# Patient Record
Sex: MALE | Race: WHITE | NOT HISPANIC OR LATINO | Employment: FULL TIME | URBAN - METROPOLITAN AREA
[De-identification: names, ages, dates, MRNs, and addresses within clinical notes are randomized per-mention and may not be internally consistent; named-entity substitution may affect disease eponyms.]

---

## 2022-04-06 ENCOUNTER — OFFICE VISIT (OUTPATIENT)
Dept: URGENT CARE | Facility: CLINIC | Age: 37
End: 2022-04-06

## 2022-04-06 VITALS
HEART RATE: 93 BPM | WEIGHT: 210 LBS | RESPIRATION RATE: 16 BRPM | HEIGHT: 74 IN | TEMPERATURE: 98 F | SYSTOLIC BLOOD PRESSURE: 151 MMHG | BODY MASS INDEX: 26.95 KG/M2 | OXYGEN SATURATION: 95 % | DIASTOLIC BLOOD PRESSURE: 108 MMHG

## 2022-04-06 DIAGNOSIS — Z11.3 SCREENING FOR STD (SEXUALLY TRANSMITTED DISEASE): ICD-10-CM

## 2022-04-06 DIAGNOSIS — N34.2 URETHRITIS: Primary | ICD-10-CM

## 2022-04-06 DIAGNOSIS — R36.9 PENILE DISCHARGE: ICD-10-CM

## 2022-04-06 LAB
BILIRUB UR QL STRIP: NEGATIVE
GLUCOSE UR QL STRIP: NEGATIVE
KETONES UR QL STRIP: NEGATIVE
LEUKOCYTE ESTERASE UR QL STRIP: POSITIVE
PH, POC UA: 5 (ref 5–8)
POC BLOOD, URINE: POSITIVE
POC NITRATES, URINE: NEGATIVE
PROT UR QL STRIP: POSITIVE
SP GR UR STRIP: 1.02 (ref 1–1.03)
UROBILINOGEN UR STRIP-ACNC: NORMAL (ref 0.3–2.2)

## 2022-04-06 PROCEDURE — 96372 THER/PROPH/DIAG INJ SC/IM: CPT | Mod: S$GLB,,,

## 2022-04-06 PROCEDURE — 99203 PR OFFICE/OUTPT VISIT, NEW, LEVL III, 30-44 MIN: ICD-10-PCS | Mod: 25,S$GLB,,

## 2022-04-06 PROCEDURE — 96372 PR INJECTION,THERAP/PROPH/DIAG2ST, IM OR SUBCUT: ICD-10-PCS | Mod: S$GLB,,,

## 2022-04-06 PROCEDURE — 87491 CHLMYD TRACH DNA AMP PROBE: CPT

## 2022-04-06 PROCEDURE — 81003 URINALYSIS AUTO W/O SCOPE: CPT | Mod: QW,S$GLB,,

## 2022-04-06 PROCEDURE — 87591 N.GONORRHOEAE DNA AMP PROB: CPT

## 2022-04-06 PROCEDURE — 81003 POCT URINALYSIS, DIPSTICK, AUTOMATED, W/O SCOPE: ICD-10-PCS | Mod: QW,S$GLB,,

## 2022-04-06 PROCEDURE — 99203 OFFICE O/P NEW LOW 30 MIN: CPT | Mod: 25,S$GLB,,

## 2022-04-06 RX ORDER — DOXYCYCLINE HYCLATE 100 MG
100 TABLET ORAL EVERY 12 HOURS
Qty: 14 TABLET | Refills: 0 | Status: SHIPPED | OUTPATIENT
Start: 2022-04-06 | End: 2022-04-13

## 2022-04-06 RX ORDER — CEFTRIAXONE 500 MG/1
500 INJECTION, POWDER, FOR SOLUTION INTRAMUSCULAR; INTRAVENOUS
Status: COMPLETED | OUTPATIENT
Start: 2022-04-06 | End: 2022-04-06

## 2022-04-06 RX ORDER — PHENAZOPYRIDINE HYDROCHLORIDE 200 MG/1
200 TABLET, FILM COATED ORAL
Qty: 15 TABLET | Refills: 0 | Status: SHIPPED | OUTPATIENT
Start: 2022-04-06 | End: 2022-04-11

## 2022-04-06 RX ORDER — LIDOCAINE HYDROCHLORIDE 10 MG/ML
1.8 INJECTION INFILTRATION; PERINEURAL
Status: COMPLETED | OUTPATIENT
Start: 2022-04-06 | End: 2022-04-06

## 2022-04-06 RX ORDER — PHENAZOPYRIDINE HYDROCHLORIDE 200 MG/1
200 TABLET, FILM COATED ORAL
Qty: 30 TABLET | Refills: 0 | Status: SHIPPED | OUTPATIENT
Start: 2022-04-06 | End: 2022-04-06

## 2022-04-06 RX ADMIN — LIDOCAINE HYDROCHLORIDE 1.8 ML: 10 INJECTION INFILTRATION; PERINEURAL at 01:04

## 2022-04-06 RX ADMIN — CEFTRIAXONE 500 MG: 500 INJECTION, POWDER, FOR SOLUTION INTRAMUSCULAR; INTRAVENOUS at 01:04

## 2022-04-06 NOTE — PROGRESS NOTES
"Subjective:       Patient ID: Pedro Haley is a 37 y.o. male.    Vitals:  height is 6' 2" (1.88 m) and weight is 95.3 kg (210 lb). His tympanic temperature is 97.8 °F (36.6 °C). His blood pressure is 151/108 (abnormal) and his pulse is 93. His respiration is 16 and oxygen saturation is 95%.     Chief Complaint: Penile Discharge    Patient reports 2 days of penile discharge.Patient does take hypertension medication but does not recall name of medicatiion.     37-year-old male presents to urgent care for evaluation.  Patient complains yellow penile discharge and dysuria x 2 days.     Penile Discharge  The patient's primary symptoms include penile discharge. The patient's pertinent negatives include no penile pain, scrotal swelling or testicular pain. This is a new problem. The current episode started in the past 7 days. The problem occurs constantly. The problem has been gradually worsening. The pain is mild. Associated symptoms include dysuria. Pertinent negatives include no frequency or urgency. Associated symptoms comments: Painful urination. The penile discharge was yellow and thick. Nothing aggravates the symptoms. He has tried nothing for the symptoms. He is sexually active. It is unknown whether or not his partner has an STD.       Genitourinary: Positive for dysuria and penile discharge. Negative for frequency, urgency, urine decreased, hematuria, painful intercourse, genital sore, painful ejaculation, penile pain, penile swelling, scrotal swelling and testicular pain.       Objective:      Physical Exam   Constitutional:      Comments:Patient declined physical exam. He is well appearing, VSS. Able to talk in complete sentences without pause. O2 sat 95% RA. Ambulated into and out of the exam room with no difficulties.     Genitourinary:         Comments: Declined by patient         Results for orders placed or performed in visit on 04/06/22   POCT Urinalysis, Dipstick, Automated, W/O Scope   Result Value " Ref Range    POC Blood, Urine Positive (A) Negative    POC Bilirubin, Urine Negative Negative    POC Urobilinogen, Urine Normal 0.3 - 2.2    POC Ketones, Urine Negative Negative    POC Protein, Urine Positive (A) Negative    POC Nitrates, Urine Negative Negative    POC Glucose, Urine Negative Negative    pH, UA 5.0 5 - 8    POC Specific Gravity, Urine 1.025 1.003 - 1.029    POC Leukocytes, Urine Positive (A) Negative           Assessment:       1. Urethritis    2. Penile discharge    3. Screening for STD (sexually transmitted disease)          Plan:       Patient well appearing, vitals stable. Presents with c/o penile discharge and burning with urination x 2 days with no other associated symptoms.  UA + for 10 leukocytes as well as blood and protein.  Urine gonorrhea/chlamydia testing done. Discussed empiric antibiotic therapy vs waiting for his tests to result to treat.  Patient would like to proceed with initiating antibiotic therapy today.  Rocephin IM given in clinic and doxy prescribed.  Also discussed treatment with Pyridium for symptoms of pain.  STD precautions discussed.  Educational handouts provided.  Patient will be called with results and treatment adjusted if necessary.  He verbalized understanding to the above plan of care.    Urethritis  -     Discontinue: phenazopyridine (PYRIDIUM) 200 MG tablet; Take 1 tablet (200 mg total) by mouth 3 (three) times daily with meals. for 10 days  Dispense: 30 tablet; Refill: 0  -     phenazopyridine (PYRIDIUM) 200 MG tablet; Take 1 tablet (200 mg total) by mouth 3 (three) times daily with meals. for 5 days  Dispense: 15 tablet; Refill: 0    Penile discharge  -     POCT Urinalysis, Dipstick, Automated, W/O Scope  -     cefTRIAXone injection 500 mg  -     C. trachomatis/N. gonorrhoeae by AMP DNA [facility]; Urine  -     LIDOcaine HCL 10 mg/ml (1%) injection 1.8 mL  -     doxycycline (VIBRA-TABS) 100 MG tablet; Take 1 tablet (100 mg total) by mouth every 12 (twelve)  hours. for 7 days  Dispense: 14 tablet; Refill: 0    Screening for STD (sexually transmitted disease)         Patient Instructions     PLEASE READ YOUR DISCHARGE INSTRUCTIONS ENTIRELY AS IT CONTAINS IMPORTANT INFORMATION.    STD Screening     You were tested for sexual transmitted diseases today, we will call you in 3-7 days with the results of the testing. If you need more medication when the labs result come in, we will call you and phone them in for you.   Increase condom use to prevent further occurance.     Please remain abstinent until further notice.     REMEMBER WEAR CONDOMS AND GET TESTED OFTEN.     IF POSITIVE:   Notify sexual partners of the need for testing.     NO sexual intercourse until given all lab results and appropriate treatment.      Complete ALL medications prescribed (if required).  Please supplement with OTC probiotics and yogurt if prescribed antibiotics.    NO sexual intercourse for 7-14 days after treatment (if required).      Retest to ensure infection has cleared - there are infections that require more agressive treatment.  Retest for all in 6 weeks (if still have symptoms) and again in 6 months to ensure true negative results.        TODAY'S TESTING WILL GIVE NO CREDIBLE INFORMATION IF YOU HAVE UNPROTECTED SEXUAL ACTIVITIES GOING FORWARD      You must understand that you've received an Urgent Care treatment only and that you may be released before all your medical problems are known or treated. You, the patient, will arrange for follow up care as instructed.    Follow up with your PCP or specialty clinic as directed in the next 1-2 weeks if not improved or as needed. You can call [phone] to schedule an appointment with the appropriate provider.    If your condition worsens we recommend that you receive another evaluation at the emergency room immediately or contact your primary medical clinic's after hours call service to discuss your concerns.    Please go to the  Emergency Department for any concerns or worsening of condition.

## 2022-04-06 NOTE — PATIENT INSTRUCTIONS
PLEASE READ YOUR DISCHARGE INSTRUCTIONS ENTIRELY AS IT CONTAINS IMPORTANT INFORMATION.    STD Screening    You were tested for sexual transmitted diseases today, we will call you in 3-7 days with the results of the testing. If you need more medication when the labs result come in, we will call you and phone them in for you.  Increase condom use to prevent further occurance.    Please remain abstinent until further notice.     REMEMBER WEAR CONDOMS AND GET TESTED OFTEN.     IF POSITIVE:  Notify sexual partners of the need for testing.    NO sexual intercourse until given all lab results and appropriate treatment.     Complete ALL medications prescribed (if required).  Please supplement with OTC probiotics and yogurt if prescribed antibiotics.   NO sexual intercourse for 7-14 days after treatment (if required).     Retest to ensure infection has cleared - there are infections that require more agressive treatment.  Retest for all in 6 weeks (if still have symptoms) and again in 6 months to ensure true negative results.        TODAY'S TESTING WILL GIVE NO CREDIBLE INFORMATION IF YOU HAVE UNPROTECTED SEXUAL ACTIVITIES GOING FORWARD      You must understand that you've received an Urgent Care treatment only and that you may be released before all your medical problems are known or treated. You, the patient, will arrange for follow up care as instructed.    Follow up with your PCP or specialty clinic as directed in the next 1-2 weeks if not improved or as needed. You can call [phone] to schedule an appointment with the appropriate provider.    If your condition worsens we recommend that you receive another evaluation at the emergency room immediately or contact your primary medical clinic's after hours call service to discuss your concerns.    Please go to the Emergency Department for any concerns or worsening of condition.

## 2022-04-07 ENCOUNTER — TELEPHONE (OUTPATIENT)
Dept: URGENT CARE | Facility: CLINIC | Age: 37
End: 2022-04-07

## 2022-04-07 LAB
C TRACH DNA SPEC QL NAA+PROBE: NOT DETECTED
N GONORRHOEA DNA SPEC QL NAA+PROBE: DETECTED

## 2022-04-07 NOTE — TELEPHONE ENCOUNTER
Calling to give gonorrhea/chlamydia results no answer left voicemail for pt to call back    Pt called back, +gonorrhea treated. Test of cure in 4-6 weeks. Notify partners